# Patient Record
Sex: MALE | Race: OTHER | HISPANIC OR LATINO | URBAN - METROPOLITAN AREA
[De-identification: names, ages, dates, MRNs, and addresses within clinical notes are randomized per-mention and may not be internally consistent; named-entity substitution may affect disease eponyms.]

---

## 2017-07-28 ENCOUNTER — EMERGENCY (EMERGENCY)
Facility: HOSPITAL | Age: 22
LOS: 1 days | Discharge: HOME | End: 2017-07-28
Attending: EMERGENCY MEDICINE | Admitting: EMERGENCY MEDICINE
Payer: COMMERCIAL

## 2017-07-28 VITALS
SYSTOLIC BLOOD PRESSURE: 121 MMHG | DIASTOLIC BLOOD PRESSURE: 72 MMHG | TEMPERATURE: 98 F | OXYGEN SATURATION: 100 % | RESPIRATION RATE: 16 BRPM | HEART RATE: 72 BPM

## 2017-07-28 VITALS
SYSTOLIC BLOOD PRESSURE: 110 MMHG | RESPIRATION RATE: 22 BRPM | TEMPERATURE: 99 F | HEART RATE: 78 BPM | DIASTOLIC BLOOD PRESSURE: 67 MMHG | OXYGEN SATURATION: 99 %

## 2017-07-28 DIAGNOSIS — Z98.890 OTHER SPECIFIED POSTPROCEDURAL STATES: Chronic | ICD-10-CM

## 2017-07-28 PROCEDURE — 73080 X-RAY EXAM OF ELBOW: CPT | Mod: 26,LT

## 2017-07-28 PROCEDURE — 73562 X-RAY EXAM OF KNEE 3: CPT | Mod: 26,50

## 2017-07-28 PROCEDURE — 99053 MED SERV 10PM-8AM 24 HR FAC: CPT

## 2017-07-28 PROCEDURE — 73030 X-RAY EXAM OF SHOULDER: CPT | Mod: 26,RT

## 2017-07-28 PROCEDURE — 99284 EMERGENCY DEPT VISIT MOD MDM: CPT | Mod: 25

## 2017-07-28 RX ORDER — TETANUS TOXOID, REDUCED DIPHTHERIA TOXOID AND ACELLULAR PERTUSSIS VACCINE, ADSORBED 5; 2.5; 8; 8; 2.5 [IU]/.5ML; [IU]/.5ML; UG/.5ML; UG/.5ML; UG/.5ML
0.5 SUSPENSION INTRAMUSCULAR ONCE
Qty: 0 | Refills: 0 | Status: COMPLETED | OUTPATIENT
Start: 2017-07-28 | End: 2017-07-28

## 2017-07-28 RX ORDER — IBUPROFEN 200 MG
1 TABLET ORAL
Qty: 20 | Refills: 0 | OUTPATIENT
Start: 2017-07-28 | End: 2017-08-02

## 2017-07-28 RX ORDER — KETOROLAC TROMETHAMINE 30 MG/ML
30 SYRINGE (ML) INJECTION ONCE
Qty: 0 | Refills: 0 | Status: DISCONTINUED | OUTPATIENT
Start: 2017-07-28 | End: 2017-07-28

## 2017-07-28 RX ORDER — METHOCARBAMOL 500 MG/1
2 TABLET, FILM COATED ORAL
Qty: 30 | Refills: 0 | OUTPATIENT
Start: 2017-07-28 | End: 2017-08-02

## 2017-07-28 RX ADMIN — Medication 30 MILLIGRAM(S): at 03:39

## 2017-07-28 RX ADMIN — TETANUS TOXOID, REDUCED DIPHTHERIA TOXOID AND ACELLULAR PERTUSSIS VACCINE, ADSORBED 0.5 MILLILITER(S): 5; 2.5; 8; 8; 2.5 SUSPENSION INTRAMUSCULAR at 04:56
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
## 2017-07-28 NOTE — ED PROVIDER NOTE - ENMT, MLM
Airway patent, Nasal mucosa clear. Mouth with normal mucosa. Throat has no vesicles, no oropharyngeal exudates and uvula is midline. C-Collar in place.
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
## 2017-07-28 NOTE — ED ADULT NURSE REASSESSMENT NOTE - NS ED NURSE REASSESS COMMENT FT1
Handoff report received Pt returned from xray. TD given left deltoid. TD Information sheet and discharge  Instructions reviewed with Pt , copy given to pt.Pt reports" pain is better in arm and knees after the medicine. Only feel it when I move."Pt ambulatory, Mother with pt. Dc instructions given by resident Lulu.  As per MD pt is stable for discharge.

## 2017-07-28 NOTE — ED PROVIDER NOTE - MEDICAL DECISION MAKING DETAILS
obtain imaging from [place] (CT Head and Neck), obtain xray b/l knees, left elbow, right shoulder. toradol for pain and tdap.

## 2022-02-07 NOTE — ED PROVIDER NOTE - CONSTITUTIONAL, MLM
Increase your water intake.  Return for any new or worsening symptoms.  Follow-up with your doctor as needed.   normal... Well appearing, well nourished, awake, alert, oriented to person, place, time/situation and in no apparent distress.

## 2023-07-15 NOTE — ED PROVIDER NOTE - CPE EDP HEAD NORM PED
Hold trazodone for next 7 days. Your COVID 19 test is positive / detected. Please reach out to your primary care physician so he/she is aware of your COVID infection. Please continue to follow CDC guidelines:   Practice social distancing, 6 feet from others   Wear a facemask around others   Wash hands frequently   Sanitize surfaces periodically throughout the day  Avoid sharing personal items such as towels, dishes, bedding         CDC guidelines recommend you stay home and self-isolate until you have met all 3 of the following conditions. 1.   At least 24 hours have passed since recovery defined as resolution of fever without the use of fever reducing medications and     2. Resolution/improvment in respiratory symptoms (e.g., cough, shortness of breath), or you never had any symptoms    3. If you meet both of the above criteria you can end isolation early after 5 days. You should continue to wear a well fitting mask around others days 6-10. Seek immediate care if you develop shortness of breath, chest pain, extreme weakness, or any other concerning symptoms.
Head atraumatic, normal cephalic shape.